# Patient Record
Sex: MALE | Race: WHITE | NOT HISPANIC OR LATINO | Employment: FULL TIME | URBAN - METROPOLITAN AREA
[De-identification: names, ages, dates, MRNs, and addresses within clinical notes are randomized per-mention and may not be internally consistent; named-entity substitution may affect disease eponyms.]

---

## 2023-08-21 ENCOUNTER — OFFICE VISIT (OUTPATIENT)
Dept: OPHTHALMOLOGY | Facility: CLINIC | Age: 63
End: 2023-08-21
Payer: COMMERCIAL

## 2023-08-21 DIAGNOSIS — H52.7 REFRACTION DISORDER: Primary | ICD-10-CM

## 2023-08-21 DIAGNOSIS — Z98.890 HISTORY OF RADIAL KERATOTOMY: ICD-10-CM

## 2023-08-21 PROCEDURE — 99999 PR PBB SHADOW E&M-NEW PATIENT-LVL III: CPT | Mod: PBBFAC,,, | Performed by: OPHTHALMOLOGY

## 2023-08-21 PROCEDURE — 1159F MED LIST DOCD IN RCRD: CPT | Mod: CPTII,S$GLB,, | Performed by: OPHTHALMOLOGY

## 2023-08-21 PROCEDURE — 1160F RVW MEDS BY RX/DR IN RCRD: CPT | Mod: CPTII,S$GLB,, | Performed by: OPHTHALMOLOGY

## 2023-08-21 PROCEDURE — 92004 PR EYE EXAM, NEW PATIENT,COMPREHESV: ICD-10-PCS | Mod: S$GLB,,, | Performed by: OPHTHALMOLOGY

## 2023-08-21 PROCEDURE — 92004 COMPRE OPH EXAM NEW PT 1/>: CPT | Mod: S$GLB,,, | Performed by: OPHTHALMOLOGY

## 2023-08-21 PROCEDURE — 1160F PR REVIEW ALL MEDS BY PRESCRIBER/CLIN PHARMACIST DOCUMENTED: ICD-10-PCS | Mod: CPTII,S$GLB,, | Performed by: OPHTHALMOLOGY

## 2023-08-21 PROCEDURE — 1159F PR MEDICATION LIST DOCUMENTED IN MEDICAL RECORD: ICD-10-PCS | Mod: CPTII,S$GLB,, | Performed by: OPHTHALMOLOGY

## 2023-08-21 PROCEDURE — 99999 PR PBB SHADOW E&M-NEW PATIENT-LVL III: ICD-10-PCS | Mod: PBBFAC,,, | Performed by: OPHTHALMOLOGY

## 2023-08-21 NOTE — PROGRESS NOTES
"      ===============================  Date today is 8/21/2023  Wiliam Bass is a 62 y.o. male  Last visit [facility]: :8/14/2015   Last visit eye dept. Visit date not found    Corrected distance visual acuity was 20/40 in the right eye and 20/20 in the left eye.  Not recorded       Wearing Rx       Wearing Rx         Sphere Cylinder Axis Add    Right +1.00   +1.50    Left -0.50 +0.50 175 +1.50      Type: Bifocal                  Manifest Refraction       Manifest Refraction         Sphere Cylinder Axis    Right       Left -0.50 +0.50 175                  Not recorded       Chief Complaint   Patient presents with    encounter for eye exam     Undilated eye exam     HPI     encounter for eye exam     Additional comments: Undilated eye exam           Comments    NO DM  NO SX'S          Last edited by Jenna Sanders on 8/21/2023  3:04 PM.      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  Check OU independently daily.    Gave my office and personal cell phone number.  ________________  8/21/2023 today  Wiliam Bass    : base main refx is: +75 sph  od                                      -75 +75 x180 os  Post rk od only    And computer glassses    +2  od .....     OS +50 +75 x  180.. (This is a +1.25 over his base rfx)    Od [place] .... Add +2.75 ...( +2 on top of his +75 base rxf)  Os  [place] ...add +1.25 +75 x 180 .....( +2 on top  Of his -75 base rfx)  "Monovision spectacles at his request"       today making  glasses    His current  +2.50 od   And +1.00 with a  2.00 add    New  glasses  Adding +1    +3.50 sphere  +2.00  sphere     + 2.25          =============================        "

## 2024-07-19 RX ORDER — NEOMYCIN SULFATE, POLYMYXIN B SULFATE, AND DEXAMETHASONE 3.5; 10000; 1 MG/G; [USP'U]/G; MG/G
OINTMENT OPHTHALMIC 3 TIMES DAILY
Qty: 3.5 G | Refills: 0 | Status: SHIPPED | OUTPATIENT
Start: 2024-07-19 | End: 2024-07-29

## 2024-07-26 RX ORDER — SULFAMETHOXAZOLE AND TRIMETHOPRIM 800; 160 MG/1; MG/1
1 TABLET ORAL 2 TIMES DAILY
Qty: 20 TABLET | Refills: 0 | Status: SHIPPED | OUTPATIENT
Start: 2024-07-26 | End: 2024-08-05

## 2024-07-26 RX ORDER — NEOMYCIN SULFATE, POLYMYXIN B SULFATE, AND DEXAMETHASONE 3.5; 10000; 1 MG/G; [USP'U]/G; MG/G
OINTMENT OPHTHALMIC 3 TIMES DAILY
Qty: 3.5 G | Refills: 4 | Status: SHIPPED | OUTPATIENT
Start: 2024-07-26 | End: 2024-08-05